# Patient Record
Sex: MALE | Race: BLACK OR AFRICAN AMERICAN | NOT HISPANIC OR LATINO | Employment: FULL TIME | URBAN - METROPOLITAN AREA
[De-identification: names, ages, dates, MRNs, and addresses within clinical notes are randomized per-mention and may not be internally consistent; named-entity substitution may affect disease eponyms.]

---

## 2022-10-28 ENCOUNTER — OFFICE VISIT (OUTPATIENT)
Dept: URGENT CARE | Facility: CLINIC | Age: 37
End: 2022-10-28

## 2022-10-28 VITALS
HEIGHT: 68 IN | WEIGHT: 170 LBS | BODY MASS INDEX: 25.76 KG/M2 | RESPIRATION RATE: 18 BRPM | OXYGEN SATURATION: 98 % | DIASTOLIC BLOOD PRESSURE: 87 MMHG | HEART RATE: 71 BPM | SYSTOLIC BLOOD PRESSURE: 134 MMHG | TEMPERATURE: 97.9 F

## 2022-10-28 DIAGNOSIS — H53.8 BLURRY VISION, LEFT EYE: Primary | ICD-10-CM

## 2022-10-28 LAB — SL AMB POCT GLUCOSE BLD: 88

## 2022-10-28 NOTE — PROGRESS NOTES
[id number]        NAME: Rufus Blanchard is a 42 y o  male  : 1985    MRN: [id number]  DATE: 2022  TIME: 12:43 PM    Assessment and Plan   Blurry vision, left eye [H53 8]  1  Blurry vision, left eye  Ambulatory Referral to Ophthalmology         Patient Instructions     Patient Instructions   Recommend follow-up with ophthalmologist for further evaluation and management of blurry vision of left eye  Discussed red flag symptoms such as vision loss, eye pain, flashes or floaters which would warrant visit to ER for further evaluation and management  Patient verbalizes understanding and is agreeable with this plan  Follow up with PCP in 3-5 days  Proceed to  ER if symptoms worsen  Chief Complaint     Chief Complaint   Patient presents with   • Eye Problem     Pt reports of blurry vision and "seeing spots" S/S started approx 2-3 months  History of Present Illness       Patient is a 33-year-old male presenting today with blurry vision of left eye x4 months  Patient notes 4 months ago he was seen and evaluated by an eye doctor for symptoms of blurry vision and eye discomfort, notes he was prescribed drops at that time for symptoms which helped resolve the discomfort but has still been experiencing some blurry vision of his left eye, notes that they did not do a vision exam while in the office at the time  States that over the last several weeks he has been experiencing intermittent episodes of blurred vision of his left eye, states sometimes worse than others  Denies headaches, eye pain, vision loss, eye flashing, lightheadedness, dizziness, chest pain, palpitations  Does not wear corrective lenses and has not had a eye examination in several years  Review of Systems   Review of Systems   Constitutional: Negative for chills and fever  HENT: Negative for ear pain and sore throat  Eyes: Positive for visual disturbance   Negative for photophobia, pain, discharge, redness and itching  Respiratory: Negative for cough and shortness of breath  Cardiovascular: Negative for chest pain and palpitations  Gastrointestinal: Negative for abdominal pain and vomiting  Genitourinary: Negative for dysuria and hematuria  Musculoskeletal: Negative for arthralgias and back pain  Skin: Negative for color change and rash  Neurological: Negative for seizures and syncope  All other systems reviewed and are negative  Current Medications     No current outpatient medications on file  Current Allergies     Allergies as of 10/28/2022   • (No Known Allergies)            The following portions of the patient's history were reviewed and updated as appropriate: allergies, current medications, past family history, past medical history, past social history, past surgical history and problem list      History reviewed  No pertinent past medical history  History reviewed  No pertinent surgical history  History reviewed  No pertinent family history  Medications have been verified  Objective   /87   Pulse 71   Temp 97 9 °F (36 6 °C)   Resp 18   Ht 5' 8" (1 727 m)   Wt 77 1 kg (170 lb)   SpO2 98%   BMI 25 85 kg/m²        Physical Exam     Physical Exam  Vitals and nursing note reviewed  Constitutional:       General: He is not in acute distress  Appearance: Normal appearance  He is not ill-appearing  HENT:      Head: Normocephalic and atraumatic  Right Ear: Tympanic membrane, ear canal and external ear normal       Left Ear: Tympanic membrane, ear canal and external ear normal       Nose: Nose normal       Mouth/Throat:      Mouth: Mucous membranes are moist       Pharynx: Oropharynx is clear  Eyes:      Extraocular Movements: Extraocular movements intact  Conjunctiva/sclera: Conjunctivae normal       Pupils: Pupils are equal, round, and reactive to light        Comments: Right eye 20/20 left eye 20/50, no photophobia, EOMs intact, no scalp or temporal tenderness   Cardiovascular:      Rate and Rhythm: Normal rate and regular rhythm  Pulses: Normal pulses  Heart sounds: Normal heart sounds  Pulmonary:      Effort: Pulmonary effort is normal       Breath sounds: Normal breath sounds  Skin:     General: Skin is warm  Capillary Refill: Capillary refill takes less than 2 seconds  Neurological:      Mental Status: He is alert

## 2022-10-28 NOTE — PATIENT INSTRUCTIONS
Recommend follow-up with ophthalmologist for further evaluation and management of blurry vision of left eye  Discussed red flag symptoms such as vision loss, eye pain, flashes or floaters which would warrant visit to ER for further evaluation and management  Patient verbalizes understanding and is agreeable with this plan